# Patient Record
Sex: MALE | Race: BLACK OR AFRICAN AMERICAN | Employment: UNEMPLOYED | URBAN - METROPOLITAN AREA
[De-identification: names, ages, dates, MRNs, and addresses within clinical notes are randomized per-mention and may not be internally consistent; named-entity substitution may affect disease eponyms.]

---

## 2020-06-22 NOTE — PROGRESS NOTES
[facility]  [address], [address]   Phone: [phone]  Fax: [phone]        INITIAL CONSULTATION      HISTORY OF PRESENT ILLNESS:  Lida Rubio is a 48 y.o. male whom is referred from Dr. Montell Heimlich secondary to progressive low back pain radiating into LLE. He rates his pain 8/10. His pain is exacerbated by prolonged positions. Additionally he reports a functional left foot drop. Pt previously underwent a spinal fusion and reports a relief in pain. He experienced an acute onset of pain x 2/2020 after lifting a heavy object. He treated with Percocet that he was previously prescribed. He is taking Lyrica 75 mg daily and Cymbalta 60 mg daily with benefit. Patient denies previous injections or physical therapy/chiropractic care. Pt denies fever, weight loss, or skin changes. Pt denies change in bowel or bladder habits. He is followed by Dr. Aysha Mann, nephrologist. PmHx of coronary artery disease, renal transplant (2006 Dr. Svetlana Shah), DM, depression, gastric bypass, L4-S1 laminectomy and decompression, L5-S1 fusion. The patient has a history of DM and reports blood sugars are well controlled, consistently remaining below 200. Note from Dr. Jessica Hollins dated 5/14/13 indicating patient was seen with c/o L4-S1 laminectomy and decompression L5-S1 fusion. Note from Dr. Aysha Mann, nephrologist dated 3/2/2020 indicating patient reports better medication compliance. He is on chronic prednisone.  reviewed. Body mass index is 37.52 kg/m².     PCP: Nicole Winchester MD    Past Medical History:   Diagnosis Date    Anxiety     CAD (coronary artery disease)     Cardiac disease     Cardiomyopathy ([facility]     2003    Chronic pain     back    CMV (cytomegalovirus infection) status positive ([facility] 11/17/09    Depression     Diabetes mellitus type II     diet control    ED (erectile dysfunction)     ESRD on dialysis ([facility] 4/20/06    Excessive urine production     Frequent UTI     Hypercholesteremia     Hypertension     Impotence of organic origin     Kidney replaced by transplant     Peritoneal dialysis status ([facility]     Sleep apnea     patient states no sleep apnea had gastric bypass    Visual disturbance    OUTH  Past Surgical History:   Procedure Laterality Date    HX CHOLECYSTECTOMY      HX GASTRIC BYPASS  12/30/2003    HX KNEE ARTHROSCOPY  1985    left    HX OTHER SURGICAL      HX Dialysis Catheter    HX OTHER SURGICAL  9/10/1980    teste removed    HX RENAL TRANSPLANT  11/15/09    HX UROLOGICAL  1980    right testicle removal    TWICE DAILY      metFORMIN ER (GLUCOPHAGE XR) 500 mg tablet Take 1 Tab by mouth two (2) times a day.  lisinopriL (PRINIVIL, ZESTRIL) 5 mg tablet       insulin glargine (LANTUS,BASAGLAR) 100 unit/mL (3 mL) inpn 12 Units by SubCUTAneous route Every morning.  glimepiride (AMARYL) 4 mg tablet Take 4 mg by mouth two (2) times a day.  DULoxetine (CYMBALTA) 60 mg capsule Take 1 Cap by mouth daily.  cholecalciferol, vitamin D3, 50 mcg (2,000 unit) tab Take  by mouth daily.  oxyCODONE-acetaminophen (PERCOCET) 5-325 mg per tablet Take 1-2 Tabs by mouth every four (4) hours as needed. 60 Tab 0    metoprolol (LOPRESSOR) 50 mg tablet Take 50 mg by mouth two (2) times a day.  aspirin delayed-release 81 mg tablet Take 81 mg by mouth daily.  mycophenolate sodium (MYFORTIC) 180 mg EC tablet Take  by mouth two (2) times a day.  tacrolimus (PROGRAF) 1 mg capsule Take  by mouth every twelve (12) hours.  omeprazole (PRILOSEC) 20 mg capsule Take 20 mg by mouth daily.  atorvastatin (LIPITOR) 20 mg tablet Take  by mouth daily.  NIFEdipine XL (PROCARDIA XL) 90 mg tablet Take 60 mg by mouth daily.  calcium-vitamin D (OYSTER SHELL) 500 mg(1,250mg) -200 unit per tablet Take 1 Tab by mouth two (2) times daily (with meals).         magnesium oxide (MAG-OXIDE) 400 mg tablet Take 400 mg by mouth daily.  cyclobenzaprine (FLEXERIL) 10 mg tablet Take 1 Tab by mouth three (3) times daily as needed for Muscle Spasm(s). 60 Tab 0    tadalafil (CIALIS) 5 mg tablet Take 5 mg by mouth as needed.  tamsulosin (FLOMAX) 0.4 mg capsule Take 0.4 mg by mouth daily. No Known Allergies         Family History   Problem Relation Age of Onset    Malignant Hyperthermia Neg Hx     Pseudocholinesterase Deficiency Neg Hx     Delayed Awakening Neg Hx     Post-op Nausea/Vomiting Neg Hx     Emergence Delirium Neg Hx     Post-op Cognitive Dysfunction Neg Hx          REVIEW OF SYSTEMS  Constitutional symptoms: Negative  Eyes: Negative  Ears, Nose, Throat, and Mouth: Negative  Cardiovascular: Negative  Respiratory: Negative  Genitourinary: Negative  Integumentary (Skin and/or breast): Negative  Musculoskeletal: Positive for progressive low back pain radiating into LLE. Extremities: Negative for edema. Endocrine/Rheumatologic: Negative  Hematologic/Lymphatic: Negative  Allergic/Immunologic: Negative  Psychiatric: Negative       PHYSICAL EXAMINATION  Visit Vitals  BP (!) 157/107 (BP 1 Location: Right arm, BP Patient Position: Sitting)   Pulse (!) 57   Temp 98.4 °F (36.9 °C) (Temporal)   Ht 6' 3\" (1.905 m)   Wt 300 lb 3.2 oz (136.2 kg)   SpO2 97%   BMI 37.52 kg/m²       CONSTITUTIONAL: NAD, A&O x 3  HEART: Regular rate and rhythm  GASTROINTESTINAL: Positive bowel sounds, soft, nontender, and nondistended  LUNGS: Clear to auscultation bilaterally. SKIN: Negative for rash. Well healed midline incision. RANGE OF MOTION: The patient has full passive range of motion in all four extremities. SENSATION: Decreased sensation to light touch on the LLE in a S1 distribution. Otherwise, sensation is intact to light touch throughout.   MOTOR:   Straight Leg Raise: Negative, bilateral  Peterson: Negative, bilateral  Deep tendon reflexes are 0 at the biceps bilaterally, 0 on the RUE and 1 on the LUE at the triceps, and 0 at the brachioradialis bilaterally. Deep tendon reflexes are 2 RLE 0 LLE at the knees and 0 at the ankles bilaterally. Limitations with ankle dorsiflexion single leg stance bilaterally. Limitations with  plantarflexion single leg stance on the left. Reports a functional foot drop on the left. Shoulder AB/Flex Elbow Flex Wrist Ext Elbow Ext Wrist Flex Hand Intrin Tone   Right +4/5 +4/5 +4/5 +4/5 +4/5 +4/5 +4/5   Left +4/5 +4/5 +4/5 +4/5 +4/5 +4/5 +4/5              Hip Flex Knee Ext Knee Flex Ankle DF GTE Ankle PF Tone   Right +4/5 +4/5 +4/5 +4/5 +4/5 +4/5 +4/5   Left +4/5 +4/5 +4/5 +4/5 +4/5 +4/5 +4/5     RADIOGRAPHS  Preliminary reading of L spine plain films dated 6/25/2020 revealed:  L5-S1 posterior fusion. Hardware intact. Fusion appears to be solid. Moderate to severe disc space narrowing at L4-5. Grade 1-2 anterolisthesis of L4 on L5. These are being sent out for official reading by Dr. Nahid Pierce. ASSESSMENT   Diagnoses and all orders for this visit:    1. Low back pain at multiple sites  -     AMB POC XRAY, SPINE, LUMBOSACRAL; 2 O  -     MRI LUMB SPINE WO CONT; Future    2. Lumbosacral spondylosis without myelopathy  -     MRI LUMB SPINE WO CONT; Future    3. Lumbar neuritis  -     MRI LUMB SPINE WO CONT; Future    4. DDD (degenerative disc disease), lumbar  -     MRI LUMB SPINE WO CONT; Future    5. Foot drop, left  -     MRI LUMB SPINE WO CONT; Future    6. Spondylolisthesis, acquired       IMPRESSIONS/RECOMMENDATIONS:  Patient presents today with c/o progressive low back pain radiating into LLE . Multiple treatment options were discussed. I will order a L spine MRI. I advised patient to bring copies of films to next visit. I will see the patient back following MRI or earlier if needed. Written by Shar Mcintyre, as dictated by Froylan Cabral MD  I examined the patient, reviewed and agree with the note.

## 2020-06-25 ENCOUNTER — OFFICE VISIT (OUTPATIENT)
Dept: ORTHOPEDIC SURGERY | Age: 55
End: 2020-06-25

## 2020-06-25 VITALS
WEIGHT: 300.2 LBS | HEIGHT: 75 IN | SYSTOLIC BLOOD PRESSURE: 157 MMHG | TEMPERATURE: 98.4 F | OXYGEN SATURATION: 97 % | BODY MASS INDEX: 37.33 KG/M2 | HEART RATE: 57 BPM | DIASTOLIC BLOOD PRESSURE: 107 MMHG

## 2020-06-25 DIAGNOSIS — M43.10 SPONDYLOLISTHESIS, ACQUIRED: ICD-10-CM

## 2020-06-25 DIAGNOSIS — M47.817 LUMBOSACRAL SPONDYLOSIS WITHOUT MYELOPATHY: ICD-10-CM

## 2020-06-25 DIAGNOSIS — M21.372 FOOT DROP, LEFT: ICD-10-CM

## 2020-06-25 DIAGNOSIS — M54.50 LOW BACK PAIN AT MULTIPLE SITES: Primary | ICD-10-CM

## 2020-06-25 DIAGNOSIS — M54.16 LUMBAR NEURITIS: ICD-10-CM

## 2020-06-25 DIAGNOSIS — M51.36 DDD (DEGENERATIVE DISC DISEASE), LUMBAR: ICD-10-CM

## 2020-06-25 RX ORDER — PREGABALIN 75 MG/1
CAPSULE ORAL
COMMUNITY
Start: 2020-05-08

## 2020-06-25 RX ORDER — INSULIN GLARGINE 100 [IU]/ML
12 INJECTION, SOLUTION SUBCUTANEOUS EVERY MORNING
COMMUNITY

## 2020-06-25 RX ORDER — METFORMIN HYDROCHLORIDE 500 MG/1
1 TABLET, EXTENDED RELEASE ORAL 2 TIMES DAILY
COMMUNITY

## 2020-06-25 RX ORDER — LISINOPRIL 5 MG/1
TABLET ORAL
COMMUNITY
Start: 2020-04-16

## 2020-06-25 RX ORDER — CHOLECALCIFEROL (VITAMIN D3) 125 MCG
CAPSULE ORAL DAILY
COMMUNITY
Start: 2013-04-04

## 2020-06-25 RX ORDER — PREDNISONE 5 MG/1
TABLET ORAL
COMMUNITY
Start: 2020-04-14

## 2020-06-25 RX ORDER — DULOXETIN HYDROCHLORIDE 60 MG/1
1 CAPSULE, DELAYED RELEASE ORAL DAILY
COMMUNITY
Start: 2018-12-20

## 2020-06-25 RX ORDER — GLIMEPIRIDE 4 MG/1
4 TABLET ORAL 2 TIMES DAILY
COMMUNITY

## 2020-06-25 NOTE — LETTER
6/25/20 Patient: Viktor Tristan YOB: 1965 Date of Visit: 6/25/2020 Nuvia Easton MD 
[address] [place] [address] VIA Facsimile: [phone] Dear Nuvia Easton MD, Thank you for referring Mr. Gabriela Woo Jr to [address] for evaluation. My notes for this consultation are attached. If you have questions, please do not hesitate to call me. I look forward to following your patient along with you. Sincerely, Alessia Vigil MD

## 2020-07-15 ENCOUNTER — HOSPITAL ENCOUNTER (OUTPATIENT)
Age: 55
Discharge: HOME | End: 2020-07-15
Attending: PHYSICAL MEDICINE & REHABILITATION
Payer: MEDICARE

## 2020-07-15 DIAGNOSIS — M54.50 LOW BACK PAIN AT MULTIPLE SITES: ICD-10-CM

## 2020-07-15 DIAGNOSIS — M21.372 FOOT DROP, LEFT: ICD-10-CM

## 2020-07-15 DIAGNOSIS — M51.36 DDD (DEGENERATIVE DISC DISEASE), LUMBAR: ICD-10-CM

## 2020-07-15 DIAGNOSIS — M47.817 LUMBOSACRAL SPONDYLOSIS WITHOUT MYELOPATHY: ICD-10-CM

## 2020-07-15 DIAGNOSIS — M54.16 LUMBAR NEURITIS: ICD-10-CM

## 2020-07-15 PROCEDURE — 72148 MRI LUMBAR SPINE W/O DYE: CPT

## 2020-10-23 NOTE — PROGRESS NOTES
[facility]  [address], [place]   Phone: [phone]  Fax: [phone]        PROGRESS NOTE      HISTORY OF PRESENT ILLNESS:  The patient is a 48 y.o. male and was seen today for follow up of progressive low back pain radiating into LLE in a S1 distribution to the calf. His pain is exacerbated by prolonged positions. Additionally he reports a functional left foot drop. Pt previously underwent a spinal fusion and reports a relief in pain. He experienced an acute onset of pain x 2/2020 after lifting a heavy object. He treated with Percocet that he was previously prescribed. He is taking Lyrica 75 mg daily and Cymbalta 60 mg daily with benefit. Patient denies previous injections or physical therapy/chiropractic care. Pt denies fever, weight loss, or skin changes. Pt denies change in bowel or bladder habits. He is followed by Dr. Yunior Rader, nephrologist. PmHx of coronary artery disease, renal transplant (2009 Dr. Chirag Padron), DM, depression, gastric bypass, L4-S1 laminectomy and decompression, L5-S1 fusion (<10 years ago by Dr. Missouri Meigs). The patient has a history of DM and reports blood sugars are well controlled, consistently remaining below 200. Note from Dr. Susanne Nettles dated 5/14/13 indicating patient was seen with c/o L4-S1 laminectomy and decompression L5-S1 fusion. Note from Dr. Yunior Rader, nephrologist dated 3/2/2020 indicating patient reports better medication compliance. He is on chronic prednisone. Preliminary reading of L spine plain films dated 6/25/2020 revealed: L5-S1 posterior fusion. Hardware intact. Fusion appears to be solid. Moderate to severe disc space narrowing at L4-5. Grade 1-2 anterolisthesis of L4 on L5. At his last clinical appointment, I ordered a L spine MRI. The patient returns today and reports pain location and distribution remains unchanged. He rates his pain 8/10, previously 8/10. I ordered his L spine MRI in 6/2020.  His MRI was done on 7/15/2020. It is unclear why there was a delay in his MRI f/u. Pt reports difficulty going up steps. Pt denies change in bowel or bladder habits. The patient has a history of DM and reports blood sugars are well controlled, consistently remaining below 200. L spine MRI dated 7/15/2020 films independently reviewed. Per report, At L4-5 there is a large left foraminal disc extrusion with either a migrated versus sequestered disc fragment extending superiorly into the left L3-4 lateral recess producing marked compression of the lateral recess to foraminal L4 nerve root. Moderately sized nonfocal disc protrusion at L2-3 combined with the diffuse congenital spinal canal narrowing produces severe spinal canal stenosis. Additional moderate spinal canal stenoses at L3-4 and borderline stenosis at T12-L1. Postoperative changes at L5-S1 with effective decompression of the spinal canal. Obscured visualization of the neural foramen and some degree of left foraminal stenosis could be present.  reviewed. Body mass index is 37.3 kg/m².     PCP: Nikunj Winchester MD      Past Medical History:   Diagnosis Date    Anxiety     CAD (coronary artery disease)     Cardiac disease     Cardiomyopathy ([facility]     2003    Chronic pain     back    CMV (cytomegalovirus infection) status positive ([facility] 11/17/09    Depression     Diabetes mellitus type II     diet control    ED (erectile dysfunction)     ESRD on dialysis ([address] 4/20/06    Excessive urine production     Frequent UTI     Hypercholesteremia     Hypertension     Impotence of organic origin     Kidney replaced by transplant     Peritoneal dialysis status ([facility]     Sleep apnea     patient states no sleep apnea had gastric bypass    Visual disturbance         Social History     Socioeconomic History    Marital status: UNKNOWN     Spouse name: Not on file    Number of children: Not on file    Years of education: Not on file    Highest education level: Not on file   Occupational History    Not on file   Social Needs    Financial resource strain: Not on file    Food insecurity     Worry: Not on file     Inability: Not on file    Transportation needs     Medical: Not on file     Non-medical: Not on file   Tobacco Use    Smoking status: Never Smoker    Smokeless tobacco: Never Used   Substance and Sexual Activity    Alcohol use: Yes     Comment: Occasionally    Drug use: No    Sexual activity: Not on file   Lifestyle    Physical activity     Days per week: Not on file     Minutes per session: Not on file    Stress: Not on file   Relationships    Social connections     Talks on phone: Not on file     Gets together: Not on file     Attends Mu-ism service: Not on file     Active member of club or organization: Not on file     Attends meetings of clubs or organizations: Not on file     Relationship status: Not on file    Intimate partner violence     Fear of current or ex partner: Not on file     Emotionally abused: Not on file     Physically abused: Not on file     Forced sexual activity: Not on file   Other Topics Concern    Not on file   Social History Narrative    Not on file       Current Outpatient Medications   Medication Sig Dispense Refill    predniSONE (DELTASONE) 5 mg tablet TAKE 1 TABLET BY MOUTH ONCE DAILY      pregabalin (LYRICA) 75 mg capsule TAKE 1 CAPSULE BY MOUTH TWICE DAILY      metFORMIN ER (GLUCOPHAGE XR) 500 mg tablet Take 1 Tab by mouth two (2) times a day.  lisinopriL (PRINIVIL, ZESTRIL) 5 mg tablet       insulin glargine (LANTUS,BASAGLAR) 100 unit/mL (3 mL) inpn 12 Units by SubCUTAneous route Every morning.  glimepiride (AMARYL) 4 mg tablet Take 4 mg by mouth two (2) times a day.  DULoxetine (CYMBALTA) 60 mg capsule Take 1 Cap by mouth daily.  cholecalciferol, vitamin D3, 50 mcg (2,000 unit) tab Take  by mouth daily.       oxyCODONE-acetaminophen (PERCOCET) 5-325 mg per tablet Take 1-2 Tabs by mouth every four (4) hours as needed. 60 Tab 0    metoprolol (LOPRESSOR) 50 mg tablet Take 50 mg by mouth two (2) times a day.  aspirin delayed-release 81 mg tablet Take 81 mg by mouth daily.  mycophenolate sodium (MYFORTIC) 180 mg EC tablet Take  by mouth two (2) times a day.  tacrolimus (PROGRAF) 1 mg capsule Take  by mouth every twelve (12) hours.  omeprazole (PRILOSEC) 20 mg capsule Take 20 mg by mouth daily.  atorvastatin (LIPITOR) 20 mg tablet Take  by mouth daily.  NIFEdipine XL (PROCARDIA XL) 90 mg tablet Take 60 mg by mouth daily.  calcium-vitamin D (OYSTER SHELL) 500 mg(1,250mg) -200 unit per tablet Take 1 Tab by mouth two (2) times daily (with meals).  magnesium oxide (MAG-OXIDE) 400 mg tablet Take 400 mg by mouth daily.  cyclobenzaprine (FLEXERIL) 10 mg tablet Take 1 Tab by mouth three (3) times daily as needed for Muscle Spasm(s). 60 Tab 0    tadalafil (CIALIS) 5 mg tablet Take 5 mg by mouth as needed.  tamsulosin (FLOMAX) 0.4 mg capsule Take 0.4 mg by mouth daily. No Known Allergies       PHYSICAL EXAMINATION    Visit Vitals  BP (!) 167/98 (BP 1 Location: Left arm, BP Patient Position: Sitting)   Pulse 82   Temp 97.4 °F (36.3 °C) (Temporal)   Wt 298 lb 6.4 oz (135.4 kg)   SpO2 96%   BMI 37.30 kg/m²       CONSTITUTIONAL: NAD, A&O x 3  SENSATION: Decreased sensation to light touch on the LLE circumferentially. Otherwise, intact to light touch throughout  RANGE OF MOTION: The patient has full passive range of motion in all four extremities. MOTOR:  Straight Leg Raise: Negative, bilateral                 Hip Flex Knee Ext Knee Flex Ankle DF GTE Ankle PF Tone   Right +4/5 +4/5 +4/5 +4/5 +4/5 +4/5 +4/5   Left +4/5 Questionable mild weakness versus pain. +4/5 +4/5 +4/5 +4/5 +4/5       ASSESSMENT   Diagnoses and all orders for this visit:    1. Low back pain at multiple sites    2.  Severe obesity (HCC)    3. Lumbosacral spondylosis without myelopathy    4. Lumbar neuritis    5. DDD (degenerative disc disease), lumbar    6. Foot drop, left    7. Spondylolisthesis, acquired    8. HNP (herniated nucleus pulposus), lumbar    9. Spinal stenosis of lumbar region, unspecified whether neurogenic claudication present        IMPRESSION AND PLAN:  Patient returns to the office today with c/o low back pain radiating into LLE in a S1 distribution to the calf. Multiple treatment options were discussed. I will refer him to Dr. Ana Simmons for surgical evaluation. I will see the patient back prn. Written by Kendall Casanova, as dictated by Barbara Hall MD  I examined the patient, reviewed and agree with the note.

## 2020-10-26 ENCOUNTER — OFFICE VISIT (OUTPATIENT)
Dept: ORTHOPEDIC SURGERY | Age: 55
End: 2020-10-26
Payer: MEDICARE

## 2020-10-26 VITALS
HEART RATE: 82 BPM | OXYGEN SATURATION: 96 % | TEMPERATURE: 97.4 F | DIASTOLIC BLOOD PRESSURE: 98 MMHG | BODY MASS INDEX: 37.3 KG/M2 | SYSTOLIC BLOOD PRESSURE: 167 MMHG | WEIGHT: 298.4 LBS

## 2020-10-26 DIAGNOSIS — M54.50 LOW BACK PAIN AT MULTIPLE SITES: Primary | ICD-10-CM

## 2020-10-26 DIAGNOSIS — M51.26 HNP (HERNIATED NUCLEUS PULPOSUS), LUMBAR: ICD-10-CM

## 2020-10-26 DIAGNOSIS — M54.16 LUMBAR NEURITIS: ICD-10-CM

## 2020-10-26 DIAGNOSIS — M48.061 SPINAL STENOSIS OF LUMBAR REGION, UNSPECIFIED WHETHER NEUROGENIC CLAUDICATION PRESENT: ICD-10-CM

## 2020-10-26 DIAGNOSIS — M21.372 FOOT DROP, LEFT: ICD-10-CM

## 2020-10-26 DIAGNOSIS — M47.817 LUMBOSACRAL SPONDYLOSIS WITHOUT MYELOPATHY: ICD-10-CM

## 2020-10-26 DIAGNOSIS — E66.01 SEVERE OBESITY (HCC): ICD-10-CM

## 2020-10-26 DIAGNOSIS — M51.36 DDD (DEGENERATIVE DISC DISEASE), LUMBAR: ICD-10-CM

## 2020-10-26 DIAGNOSIS — M43.10 SPONDYLOLISTHESIS, ACQUIRED: ICD-10-CM

## 2020-10-26 PROCEDURE — G9231 DOC ESRD DIA TRANS PREG: HCPCS | Performed by: PHYSICAL MEDICINE & REHABILITATION

## 2020-10-26 PROCEDURE — G8417 CALC BMI ABV UP PARAM F/U: HCPCS | Performed by: PHYSICAL MEDICINE & REHABILITATION

## 2020-10-26 PROCEDURE — 3017F COLORECTAL CA SCREEN DOC REV: CPT | Performed by: PHYSICAL MEDICINE & REHABILITATION

## 2020-10-26 PROCEDURE — G8427 DOCREV CUR MEDS BY ELIG CLIN: HCPCS | Performed by: PHYSICAL MEDICINE & REHABILITATION

## 2020-10-26 PROCEDURE — G9717 DOC PT DX DEP/BP F/U NT REQ: HCPCS | Performed by: PHYSICAL MEDICINE & REHABILITATION

## 2020-10-26 PROCEDURE — 99214 OFFICE O/P EST MOD 30 MIN: CPT | Performed by: PHYSICAL MEDICINE & REHABILITATION

## 2020-10-26 NOTE — LETTER
10/26/20 Patient: Josh Nicholson YOB: 1965 Date of Visit: 10/26/2020 Susu Olivas MD 
[address] [place], [place] [address] VIA Facsimile: [phone] Dear Susu Olivas MD, Thank you for referring Mr. Gabriela Woo Jr to [address] for evaluation. My notes for this consultation are attached. If you have questions, please do not hesitate to call me. I look forward to following your patient along with you. Sincerely, Telma Doe MD

## 2020-11-06 ENCOUNTER — OFFICE VISIT (OUTPATIENT)
Dept: ORTHOPEDIC SURGERY | Age: 55
End: 2020-11-06
Payer: MEDICARE

## 2020-11-06 VITALS
HEART RATE: 68 BPM | BODY MASS INDEX: 37.5 KG/M2 | SYSTOLIC BLOOD PRESSURE: 182 MMHG | DIASTOLIC BLOOD PRESSURE: 111 MMHG | RESPIRATION RATE: 16 BRPM | WEIGHT: 300 LBS | TEMPERATURE: 97.5 F

## 2020-11-06 DIAGNOSIS — Z98.1 HISTORY OF LUMBAR FUSION: ICD-10-CM

## 2020-11-06 DIAGNOSIS — M54.16 LUMBAR RADICULOPATHY: ICD-10-CM

## 2020-11-06 DIAGNOSIS — M51.26 HNP (HERNIATED NUCLEUS PULPOSUS), LUMBAR: Primary | ICD-10-CM

## 2020-11-06 PROCEDURE — G9717 DOC PT DX DEP/BP F/U NT REQ: HCPCS | Performed by: ORTHOPAEDIC SURGERY

## 2020-11-06 PROCEDURE — 99203 OFFICE O/P NEW LOW 30 MIN: CPT | Performed by: ORTHOPAEDIC SURGERY

## 2020-11-06 PROCEDURE — G8427 DOCREV CUR MEDS BY ELIG CLIN: HCPCS | Performed by: ORTHOPAEDIC SURGERY

## 2020-11-06 PROCEDURE — G8417 CALC BMI ABV UP PARAM F/U: HCPCS | Performed by: ORTHOPAEDIC SURGERY

## 2020-11-06 PROCEDURE — 3017F COLORECTAL CA SCREEN DOC REV: CPT | Performed by: ORTHOPAEDIC SURGERY

## 2020-11-06 PROCEDURE — G9231 DOC ESRD DIA TRANS PREG: HCPCS | Performed by: ORTHOPAEDIC SURGERY

## 2020-11-06 NOTE — LETTER
11/6/20 Patient: Elva Santos YOB: 1965 Date of Visit: 11/6/2020 Cathy Rucker MD 
[address] [place] [address] [facility] [address] VIA Facsimile: [phone] Dear Cathy Rucker MD, Thank you for referring Mr. Gabriela Woo Jr to  [facility] for evaluation. My notes for this consultation are attached. If you have questions, please do not hesitate to call me. I look forward to following your patient along with you.  
 
 
Sincerely, 
 
Wendy Fernandez MD

## 2020-11-06 NOTE — PROGRESS NOTES
[facility]  [address], [address] [facility], [facility] [address]  Phone: [phone]  Fax: [phone]  INITIAL CONSULTATION  Patient: Gabriel Tucker                MRN: [id number]       SSN: [id number]  YOB: 1965        AGE: 48 y.o. SEX: male  Body mass index is 37.5 kg/m². PCP: Anai Winchester MD  11/06/20    Chief Complaint   Patient presents with    Back Pain     SC         HISTORY OF PRESENT ILLNESS, RADIOGRAPHS, and PLAN:       Seen today at request of Dr. Emilie Mccord. Patient is a 62-year-old male previous kidney transplant hypertension had a previous fusion at L5-S1 in 2013. Comes in with a year of progressive pain in his back buttock rating down his left leg. His exam demonstrates a positive straight leg raise no focal weakness. Denies bowel dysfunction. No bladder dysfunction. Radiographs demonstrate previous 5 1 fusion a large extruded sequestered disc herniation at L4-5 on the left. Also degenerative changes at L2-3. I discussed the matter at length with him his options as things that would either be pain management does not wish more injections a failed previously he was 1 medication management or surgery if surgery can either be attempted local decompression at 455 remove the extruded fragment versus a decompression and fusion. I would not address L2-3 adamantly it is clearly symptomatic and any surgery for him given his transplant status should be as small as possible. Episodes since discussion he would like to continue with medication management. I have given him a list of pain management providers I be happy to consider him most likely 445 left laminotomy he decided he wished a surgical approach. This dictation was created utilizing voice recognition software. Errors may be present.      Past Medical History:   Diagnosis Date    Anxiety     CAD (coronary artery disease)     Cardiac disease     Cardiomyopathy [facility])     2003    Chronic pain     back    CMV (cytomegalovirus infection) status positive ([facility] 11/17/09    Depression     Diabetes mellitus type II     diet control    ED (erectile dysfunction)     ESRD on dialysis ([facility] 4/20/06    Excessive urine production     Frequent UTI     Hypercholesteremia     Hypertension     Impotence of organic origin     Kidney replaced by transplant     [facility]     Sleep apnea     patient states no sleep apnea had gastric bypass    Visual disturbance        Family History   Problem Relation Age of Onset    Malignant Hyperthermia Neg Hx     Pseudocholinesterase Deficiency Neg Hx     Delayed Awakening Neg Hx     Post-op Nausea/Vomiting Neg Hx     Emergence Delirium Neg Hx     Post-op Cognitive Dysfunction Neg Hx        Current Outpatient Medications   Medication Sig Dispense Refill    pregabalin (LYRICA) 75 mg capsule TAKE 1 CAPSULE BY MOUTH TWICE DAILY      metFORMIN ER (GLUCOPHAGE XR) 500 mg tablet Take 1 Tab by mouth two (2) times a day.  lisinopriL (PRINIVIL, ZESTRIL) 5 mg tablet       insulin glargine (LANTUS,BASAGLAR) 100 unit/mL (3 mL) inpn 12 Units by SubCUTAneous route Every morning.  glimepiride (AMARYL) 4 mg tablet Take 4 mg by mouth two (2) times a day.  DULoxetine (CYMBALTA) 60 mg capsule Take 1 Cap by mouth daily.  cholecalciferol, vitamin D3, 50 mcg (2,000 unit) tab Take  by mouth daily.  metoprolol (LOPRESSOR) 50 mg tablet Take 50 mg by mouth two (2) times a day.  aspirin delayed-release 81 mg tablet Take 81 mg by mouth daily.  mycophenolate sodium (MYFORTIC) 180 mg EC tablet Take  by mouth two (2) times a day.  tacrolimus (PROGRAF) 1 mg capsule Take  by mouth every twelve (12) hours.  tadalafil (CIALIS) 5 mg tablet Take 5 mg by mouth as needed.  omeprazole (PRILOSEC) 20 mg capsule Take 20 mg by mouth daily.         atorvastatin (LIPITOR) 20 mg tablet Take  by mouth daily.        tamsulosin (FLOMAX) 0.4 mg capsule Take 0.4 mg by mouth daily.  NIFEdipine XL (PROCARDIA XL) 90 mg tablet Take 60 mg by mouth daily.  calcium-vitamin D (OYSTER SHELL) 500 mg(1,250mg) -200 unit per tablet Take 1 Tab by mouth two (2) times daily (with meals).  magnesium oxide (MAG-OXIDE) 400 mg tablet Take 400 mg by mouth daily.  predniSONE (DELTASONE) 5 mg tablet TAKE 1 TABLET BY MOUTH ONCE DAILY      cyclobenzaprine (FLEXERIL) 10 mg tablet Take 1 Tab by mouth three (3) times daily as needed for Muscle Spasm(s). 60 Tab 0    oxyCODONE-acetaminophen (PERCOCET) 5-325 mg per tablet Take 1-2 Tabs by mouth every four (4) hours as needed.  61 Tab 0       No Known Allergies    Past Surgical History:   Procedure Laterality Date    HX CHOLECYSTECTOMY      HX GASTRIC BYPASS  12/30/2003    HX KNEE ARTHROSCOPY  1985    left    HX OTHER SURGICAL      HX Dialysis Catheter    HX OTHER SURGICAL  9/10/1980    teste removed    HX RENAL TRANSPLANT  11/15/09    HX UROLOGICAL  1980    right testicle removal       Past Medical History:   Diagnosis Date    Anxiety     CAD (coronary artery disease)     Cardiac disease     Cardiomyopathy ([facility]     2003    Chronic pain     back    CMV (cytomegalovirus infection) status positive ([facility] 11/17/09    Depression     Diabetes mellitus type II     diet control    ED (erectile dysfunction)     ESRD on dialysis ([address] 4/20/06    Excessive urine production     Frequent UTI     Hypercholesteremia     Hypertension     Impotence of organic origin     Kidney replaced by transplant     Peritoneal dialysis status ([facility]     Sleep apnea     patient states no sleep apnea had gastric bypass    Visual disturbance        Social History     Socioeconomic History    Marital status: UNKNOWN     Spouse name: Not on file    Number of children: Not on file    Years of education: Not on file    Highest education level: Not on file   Occupational History    Not on file   Social Needs    Financial resource strain: Not on file    Food insecurity     Worry: Not on file     Inability: Not on file    Transportation needs     Medical: Not on file     Non-medical: Not on file   Tobacco Use    Smoking status: Never Smoker    Smokeless tobacco: Never Used   Substance and Sexual Activity    Alcohol use: Yes     Comment: Occasionally    Drug use: No    Sexual activity: Not on file   Lifestyle    Physical activity     Days per week: Not on file     Minutes per session: Not on file    Stress: Not on file   Relationships    Social connections     Talks on phone: Not on file     Gets together: Not on file     Attends Faith service: Not on file     Active member of club or organization: Not on file     Attends meetings of clubs or organizations: Not on file     Relationship status: Not on file    Intimate partner violence     Fear of current or ex partner: Not on file     Emotionally abused: Not on file     Physically abused: Not on file     Forced sexual activity: Not on file   Other Topics Concern    Not on file   Social History Narrative    Not on file           REVIEW OF SYSTEMS:   CONSTITUTIONAL SYMPTOMS:  Negative. EYES:  Negative. EARS, NOSE, THROAT AND MOUTH:  Negative. CARDIOVASCULAR:  Negative. RESPIRATORY:  Negative. GENITOURINARY: Per HPI. GASTROINTESTINAL:  Per HPI. INTEGUMENTARY (SKIN AND/OR BREAST):  Negative. MUSCULOSKELETAL: Per HPI.   ENDOCRINE/RHEUMATOLOGIC:  Negative. NEUROLOGICAL:  Per HPI. HEMATOLOGIC/LYMPHATIC:  Negative. ALLERGIC/IMMUNOLOGIC:  Negative. PSYCHIATRIC:  Negative.     PHYSICAL EXAMINATION:   Visit Vitals  BP (!) 182/111 (BP 1 Location: Left arm, BP Patient Position: Sitting) Comment: has not taken meds   Pulse 68   Temp 97.5 °F (36.4 °C) (Temporal)   Resp 16   Wt 300 lb (136.1 kg)   BMI 37.50 kg/m²    PAIN SCALE: 2/10    CONSTITUTIONAL: The patient is in no apparent distress and is alert and oriented x 3. HEENT: Normocephalic. Hearing grossly intact. NECK: Supple and symmetric. no tenderness, or masses were felt. RESPIRATORY: No labored breathing. CARDIOVASCULAR: The carotid pulses were normal. Peripheral pulses were 2+. CHEST: Normal AP diameter and normal contour without any kyphoscoliosis. LYMPHATIC: No lymphadenopathy was appreciated in the neck, axillae or groin. SKIN:  Negative for scars, rashes, lesions, or ulcers on the right upper, right lower, left upper, left lower and trunk. NEUROLOGICAL: Alert and oriented x 3. Ambulation without assistive device. FWB. EXTREMITIES:  See musculoskeletal.  MUSCULOSKELETAL:   Head and Neck: Negative for misalignment, asymmetry, crepitation, defects, tenderness masses or effusions.  Left Upper Extremity: Inspection, percussion and palpation performed. Petersons sign is negative.  Right Upper Extremity: Inspection, percussion and palpation performed. Petersons sign is negative.  Spine, Ribs and Pelvis: Low back pain radiating to the posterolateral aspect of the LLE. Left buttock pain. Inspection, percussion and palpation performed. Negative for misalignment, asymmetry, crepitation, defects, tenderness masses or effusions.  Left Lower Extremity: Radicular pain. Inspection, percussion and palpation performed. Positive straight leg raise.  Right Lower Extremity: Inspection, percussion and palpation performed. Negative straight leg raise. SPINE EXAM:     Cervical spine: Neck is midline. Normal muscle tone. No focal atrophy is noted. Lumbar spine: No rash, ecchymosis, or gross obliquity. No focal atrophy is noted. ASSESSMENT    ICD-10-CM ICD-9-CM    1. HNP (herniated nucleus pulposus), lumbar  M51.26 722.10    2. Lumbar radiculopathy  M54.16 724.4    3.  History of lumbar fusion  Z98.1 V45.4        Written by Stephen Nicholson, as dictated by Cyndi Parry MD.    I, Dr. Cyndi Parry MD, confirm that all documentation is accurate.

## 2020-11-06 NOTE — PATIENT INSTRUCTIONS
Herniated Disc: Exercises  Introduction  Here are some examples of exercises for you to try. The exercises may be suggested for a condition or for rehabilitation. Start each exercise slowly. Ease off the exercises if you start to have pain. You will be told when to start these exercises and which ones will work best for you. How to stay safe  These exercises can help you move easier and feel better. But when you first start doing them, you may have more pain in your back. This is normal. But it is important to pay close attention to your pain during and after each exercise. · Keep doing these exercises if your pain stays the same or moves from your leg and buttock more toward the middle of your spine. Pain moving out of your leg and buttock is a good sign. · Stop doing these exercises if your pain gets worse in your leg and buttock. Stop if you start to have pain in your leg and buttock that you didn't have before. Be sure to do these exercises in the order they appear. Note how your pain changes before you move to the next one. If your pain is much worse right after exercise and stays worse the next day, do not do any of these exercises. How to do the exercises  1. Rest on belly   1. Lie on your stomach, with your head turned to the side. 2. Try to relax your lower back muscles as much as you can. 3. Continue to lie on your stomach for 2 minutes. · Keep your arms beside your body. · If that position bothers your neck, place your hands, one on top of the other, underneath your forehead. This will help support your head and neck. If lying in this position causes or increases pain down your leg, stop this exercise and do not do the next exercises. 2. Press-up back extension   1. Lie on your stomach, with your face down and your elbows tucked into your sides and under your shoulders. 2. Press your elbows down into the floor to raise your upper back.   3. Hold this position for 15 to 30 seconds. 4. Repeat 2 to 4 times. · As you do this, relax your stomach muscles and allow your back to arch without using your back muscles. · Let your low back relax completely as you arch up. Don't let your hips or pelvis come off the floor. Then relax, and return to the start position. Over time, work up to staying in the press-up position for up to 2 minutes. If lying in this position causes or increases pain down your leg, stop this exercise and do not do the next exercises. 3. Full press-up back extension   1. Lie on your stomach with your face down, keeping your elbows tucked into your sides and under your shoulders. 2. Straighten your elbows, and push your upper body up as far as you can. 3. Hold this position for 5 seconds, and then relax. 4. Repeat 10 times. Allow your lower back to sag. Keep your hips, pelvis, and legs relaxed. Each time, try to raise your upper body a little higher and hold your arms a bit straighter. If lying in this position causes or increases pain down your leg, stop this exercise and do not do the next exercises. If you can't do this exercise, you may instead try the backward bend exercise that follows. 4. Backward bend   1. Stand with your feet hip-width apart, and don't lock your knees. 2. Place your hands in the small of your back. 3. Bend backward as far as you can, keeping your knees straight. 4. Repeat 2 to 4 times. Your toes should be pointing forward. Hold this position for 2 to 3 seconds. Then return to your starting position. Each time, try to bend backward a little farther, until you bend backward as far as you can. If standing in this position causes or increases pain down your leg, stop this exercise. Follow-up care is a key part of your treatment and safety. Be sure to make and go to all appointments, and call your doctor if you are having problems.  It's also a good idea to know your test results and keep a list of the medicines you take.  Where can you learn more? Go to http://www.gray.com/  Enter Z594 in the search box to learn more about \"Herniated Disc: Exercises. \"  Current as of: March 2, 2020               Content Version: 12.6  © 0606-6668 [employer]. Care instructions adapted under license by [employer] (which disclaims liability or warranty for this information). If you have questions about a medical condition or this instruction, always ask your healthcare professional. [place] [address] any warranty or liability for your use of this information.

## 2020-11-25 ENCOUNTER — TELEPHONE (OUTPATIENT)
Dept: ORTHOPEDIC SURGERY | Age: 55
End: 2020-11-25

## 2020-11-25 DIAGNOSIS — Z98.1 HISTORY OF LUMBAR FUSION: ICD-10-CM

## 2020-11-25 DIAGNOSIS — M51.26 HNP (HERNIATED NUCLEUS PULPOSUS), LUMBAR: Primary | ICD-10-CM

## 2020-11-25 DIAGNOSIS — M54.16 LUMBAR RADICULOPATHY: ICD-10-CM

## 2022-03-02 ENCOUNTER — HOSPITAL ENCOUNTER (OUTPATIENT)
Dept: CT IMAGING | Age: 57
Discharge: HOME | End: 2022-03-02
Attending: PHYSICIAN ASSISTANT
Payer: MEDICARE

## 2022-03-02 DIAGNOSIS — R31.0 GROSS HEMATURIA: ICD-10-CM

## 2022-03-02 LAB — CREAT UR-MCNC: 1.3 MG/DL (ref 0.6–1.3)

## 2022-03-02 PROCEDURE — 74011000636 HC RX REV CODE- 636: Performed by: PHYSICIAN ASSISTANT

## 2022-03-02 PROCEDURE — 82565 ASSAY OF CREATININE: CPT

## 2022-03-02 PROCEDURE — 74178 CT ABD&PLV WO CNTR FLWD CNTR: CPT

## 2022-03-02 RX ADMIN — IOPAMIDOL 100 ML: 755 INJECTION, SOLUTION INTRAVENOUS at 11:17

## 2022-03-19 PROBLEM — E66.01 SEVERE OBESITY (HCC): Status: ACTIVE | Noted: 2020-10-26

## 2024-03-08 ENCOUNTER — TRANSCRIBE ORDERS (OUTPATIENT)
Facility: HOSPITAL | Age: 59
End: 2024-03-08

## 2024-03-08 ENCOUNTER — HOSPITAL ENCOUNTER (OUTPATIENT)
Facility: HOSPITAL | Age: 59
End: 2024-03-08
Payer: MEDICARE

## 2024-03-08 DIAGNOSIS — M47.817 LUMBOSACRAL SPONDYLOSIS WITHOUT MYELOPATHY: ICD-10-CM

## 2024-03-08 DIAGNOSIS — G83.4 CAUDA EQUINA SYNDROME WITH NEUROGENIC BLADDER (HCC): ICD-10-CM

## 2024-03-08 DIAGNOSIS — M48.062 SPINAL STENOSIS, LUMBAR REGION, WITH NEUROGENIC CLAUDICATION: ICD-10-CM

## 2024-03-08 DIAGNOSIS — M48.062 SPINAL STENOSIS, LUMBAR REGION, WITH NEUROGENIC CLAUDICATION: Primary | ICD-10-CM

## 2024-03-08 LAB
ALBUMIN SERPL-MCNC: 3.1 G/DL (ref 3.4–5)
ALBUMIN/GLOB SERPL: 1 (ref 0.8–1.7)
ALP SERPL-CCNC: 83 U/L (ref 45–117)
ALT SERPL-CCNC: 30 U/L (ref 16–61)
ANION GAP SERPL CALC-SCNC: 5 MMOL/L (ref 3–18)
AST SERPL-CCNC: 17 U/L (ref 10–38)
BASOPHILS # BLD: 0 K/UL (ref 0–0.1)
BASOPHILS NFR BLD: 0 % (ref 0–2)
BILIRUB SERPL-MCNC: 0.7 MG/DL (ref 0.2–1)
BUN SERPL-MCNC: 16 MG/DL (ref 7–18)
BUN/CREAT SERPL: 13 (ref 12–20)
CALCIUM SERPL-MCNC: 8.8 MG/DL (ref 8.5–10.1)
CHLORIDE SERPL-SCNC: 112 MMOL/L (ref 100–111)
CO2 SERPL-SCNC: 27 MMOL/L (ref 21–32)
CREAT SERPL-MCNC: 1.22 MG/DL (ref 0.6–1.3)
DIFFERENTIAL METHOD BLD: ABNORMAL
EOSINOPHIL # BLD: 0 K/UL (ref 0–0.4)
EOSINOPHIL NFR BLD: 0 % (ref 0–5)
ERYTHROCYTE [DISTWIDTH] IN BLOOD BY AUTOMATED COUNT: 13.7 % (ref 11.6–14.5)
GLOBULIN SER CALC-MCNC: 3.2 G/DL (ref 2–4)
GLUCOSE SERPL-MCNC: 83 MG/DL (ref 74–99)
HCT VFR BLD AUTO: 39.8 % (ref 36–48)
HGB BLD-MCNC: 12.6 G/DL (ref 13–16)
IMM GRANULOCYTES # BLD AUTO: 0 K/UL (ref 0–0.04)
IMM GRANULOCYTES NFR BLD AUTO: 0 % (ref 0–0.5)
LYMPHOCYTES # BLD: 1.3 K/UL (ref 0.9–3.6)
LYMPHOCYTES NFR BLD: 23 % (ref 21–52)
MCH RBC QN AUTO: 28.6 PG (ref 24–34)
MCHC RBC AUTO-ENTMCNC: 31.7 G/DL (ref 31–37)
MCV RBC AUTO: 90.5 FL (ref 78–100)
MONOCYTES # BLD: 0.4 K/UL (ref 0.05–1.2)
MONOCYTES NFR BLD: 6 % (ref 3–10)
NEUTS SEG # BLD: 4 K/UL (ref 1.8–8)
NEUTS SEG NFR BLD: 70 % (ref 40–73)
NRBC # BLD: 0 K/UL (ref 0–0.01)
NRBC BLD-RTO: 0 PER 100 WBC
PLATELET # BLD AUTO: 248 K/UL (ref 135–420)
PMV BLD AUTO: 10.2 FL (ref 9.2–11.8)
POTASSIUM SERPL-SCNC: 3.7 MMOL/L (ref 3.5–5.5)
PROT SERPL-MCNC: 6.3 G/DL (ref 6.4–8.2)
RBC # BLD AUTO: 4.4 M/UL (ref 4.35–5.65)
SODIUM SERPL-SCNC: 144 MMOL/L (ref 136–145)
WBC # BLD AUTO: 5.7 K/UL (ref 4.6–13.2)

## 2024-03-08 PROCEDURE — 36415 COLL VENOUS BLD VENIPUNCTURE: CPT

## 2024-03-08 PROCEDURE — 85025 COMPLETE CBC W/AUTO DIFF WBC: CPT

## 2024-03-08 PROCEDURE — 83036 HEMOGLOBIN GLYCOSYLATED A1C: CPT

## 2024-03-08 PROCEDURE — 80053 COMPREHEN METABOLIC PANEL: CPT

## 2024-03-09 LAB
BACTERIA SPEC CULT: NORMAL
BACTERIA SPEC CULT: NORMAL
EST. AVERAGE GLUCOSE BLD GHB EST-MCNC: 203 MG/DL
HBA1C MFR BLD: 8.7 % (ref 4.2–5.6)
SERVICE CMNT-IMP: NORMAL